# Patient Record
Sex: FEMALE | Race: WHITE | NOT HISPANIC OR LATINO | URBAN - METROPOLITAN AREA
[De-identification: names, ages, dates, MRNs, and addresses within clinical notes are randomized per-mention and may not be internally consistent; named-entity substitution may affect disease eponyms.]

---

## 2018-02-15 ENCOUNTER — OFFICE (OUTPATIENT)
Dept: URBAN - METROPOLITAN AREA CLINIC 11 | Facility: CLINIC | Age: 78
End: 2018-02-15

## 2018-02-15 VITALS
HEART RATE: 65 BPM | HEIGHT: 65 IN | WEIGHT: 163 LBS | SYSTOLIC BLOOD PRESSURE: 167 MMHG | DIASTOLIC BLOOD PRESSURE: 72 MMHG

## 2018-02-15 DIAGNOSIS — R13.10 DYSPHAGIA, UNSPECIFIED: ICD-10-CM

## 2018-02-15 DIAGNOSIS — K21.9 GASTRO-ESOPHAGEAL REFLUX DISEASE WITHOUT ESOPHAGITIS: ICD-10-CM

## 2018-02-15 DIAGNOSIS — K58.0 IRRITABLE BOWEL SYNDROME WITH DIARRHEA: ICD-10-CM

## 2018-02-15 PROCEDURE — 99213 OFFICE O/P EST LOW 20 MIN: CPT | Performed by: INTERNAL MEDICINE

## 2018-02-15 RX ORDER — SUCRALFATE 1 G/10ML
200 SUSPENSION ORAL
Qty: 30 | Refills: 2 | Status: COMPLETED
Start: 2018-02-15 | End: 2018-02-21

## 2018-02-15 RX ORDER — OMEPRAZOLE 40 MG/1
80 CAPSULE, DELAYED RELEASE ORAL
Qty: 60 | Refills: 3 | Status: COMPLETED
Start: 2016-02-18 | End: 2020-01-10

## 2018-02-21 ENCOUNTER — AMBULATORY SURGICAL CENTER (OUTPATIENT)
Dept: URBAN - METROPOLITAN AREA SURGERY 3 | Facility: SURGERY | Age: 78
End: 2018-02-21
Payer: MEDICARE

## 2018-02-21 ENCOUNTER — AMBULATORY SURGICAL CENTER (OUTPATIENT)
Dept: URBAN - METROPOLITAN AREA SURGERY 3 | Facility: SURGERY | Age: 78
End: 2018-02-21

## 2018-02-21 ENCOUNTER — OFFICE (OUTPATIENT)
Dept: URBAN - METROPOLITAN AREA PATHOLOGY 22 | Facility: PATHOLOGY | Age: 78
End: 2018-02-21

## 2018-02-21 VITALS
OXYGEN SATURATION: 96 % | RESPIRATION RATE: 18 BRPM | SYSTOLIC BLOOD PRESSURE: 108 MMHG | RESPIRATION RATE: 19 BRPM | DIASTOLIC BLOOD PRESSURE: 65 MMHG | OXYGEN SATURATION: 95 % | HEART RATE: 68 BPM | SYSTOLIC BLOOD PRESSURE: 156 MMHG | SYSTOLIC BLOOD PRESSURE: 152 MMHG | RESPIRATION RATE: 16 BRPM | WEIGHT: 160 LBS | RESPIRATION RATE: 18 BRPM | HEART RATE: 70 BPM | SYSTOLIC BLOOD PRESSURE: 108 MMHG | DIASTOLIC BLOOD PRESSURE: 74 MMHG | DIASTOLIC BLOOD PRESSURE: 56 MMHG | HEART RATE: 78 BPM | DIASTOLIC BLOOD PRESSURE: 74 MMHG | DIASTOLIC BLOOD PRESSURE: 56 MMHG | HEART RATE: 65 BPM | SYSTOLIC BLOOD PRESSURE: 152 MMHG | DIASTOLIC BLOOD PRESSURE: 75 MMHG | TEMPERATURE: 97.1 F | OXYGEN SATURATION: 96 % | DIASTOLIC BLOOD PRESSURE: 75 MMHG | OXYGEN SATURATION: 95 % | OXYGEN SATURATION: 98 % | DIASTOLIC BLOOD PRESSURE: 69 MMHG | HEART RATE: 78 BPM | TEMPERATURE: 97.3 F | OXYGEN SATURATION: 93 % | TEMPERATURE: 97.3 F | SYSTOLIC BLOOD PRESSURE: 124 MMHG | HEIGHT: 65 IN | WEIGHT: 160 LBS | SYSTOLIC BLOOD PRESSURE: 124 MMHG | RESPIRATION RATE: 19 BRPM | HEART RATE: 77 BPM | HEART RATE: 70 BPM | HEART RATE: 77 BPM | HEART RATE: 68 BPM | HEIGHT: 65 IN | OXYGEN SATURATION: 98 % | OXYGEN SATURATION: 93 % | HEART RATE: 65 BPM | DIASTOLIC BLOOD PRESSURE: 69 MMHG | TEMPERATURE: 97.1 F | DIASTOLIC BLOOD PRESSURE: 65 MMHG | SYSTOLIC BLOOD PRESSURE: 156 MMHG | SYSTOLIC BLOOD PRESSURE: 138 MMHG | RESPIRATION RATE: 16 BRPM | SYSTOLIC BLOOD PRESSURE: 138 MMHG

## 2018-02-21 DIAGNOSIS — K31.89 OTHER DISEASES OF STOMACH AND DUODENUM: ICD-10-CM

## 2018-02-21 DIAGNOSIS — R13.10 DYSPHAGIA, UNSPECIFIED: ICD-10-CM

## 2018-02-21 DIAGNOSIS — K44.9 DIAPHRAGMATIC HERNIA WITHOUT OBSTRUCTION OR GANGRENE: ICD-10-CM

## 2018-02-21 PROCEDURE — G8918 PT W/O PREOP ORDER IV AB PRO: HCPCS | Performed by: INTERNAL MEDICINE

## 2018-02-21 PROCEDURE — 88342 IMHCHEM/IMCYTCHM 1ST ANTB: CPT | Performed by: INTERNAL MEDICINE

## 2018-02-21 PROCEDURE — 88313 SPECIAL STAINS GROUP 2: CPT | Performed by: INTERNAL MEDICINE

## 2018-02-21 PROCEDURE — 43239 EGD BIOPSY SINGLE/MULTIPLE: CPT | Performed by: INTERNAL MEDICINE

## 2018-02-21 PROCEDURE — 88305 TISSUE EXAM BY PATHOLOGIST: CPT | Performed by: INTERNAL MEDICINE

## 2018-02-21 PROCEDURE — G8907 PT DOC NO EVENTS ON DISCHARG: HCPCS | Performed by: INTERNAL MEDICINE

## 2019-12-13 ENCOUNTER — OFFICE (OUTPATIENT)
Dept: URBAN - METROPOLITAN AREA CLINIC 11 | Facility: CLINIC | Age: 79
End: 2019-12-13

## 2019-12-13 VITALS
HEART RATE: 49 BPM | HEIGHT: 65 IN | DIASTOLIC BLOOD PRESSURE: 34 MMHG | SYSTOLIC BLOOD PRESSURE: 110 MMHG | WEIGHT: 150 LBS

## 2019-12-13 DIAGNOSIS — Z79.01 LONG TERM (CURRENT) USE OF ANTICOAGULANTS: ICD-10-CM

## 2019-12-13 DIAGNOSIS — I50.9 HEART FAILURE, UNSPECIFIED: ICD-10-CM

## 2019-12-13 DIAGNOSIS — D50.9 IRON DEFICIENCY ANEMIA, UNSPECIFIED: ICD-10-CM

## 2019-12-13 DIAGNOSIS — J44.9 CHRONIC OBSTRUCTIVE PULMONARY DISEASE, UNSPECIFIED: ICD-10-CM

## 2019-12-13 PROCEDURE — 99214 OFFICE O/P EST MOD 30 MIN: CPT | Performed by: INTERNAL MEDICINE

## 2019-12-13 NOTE — SERVICEHPINOTES
She presents for evaluation of an iron def anemia.  She has had issues for a couple of months and was to have started iron a couple of months ago but did not start it.  Over he past month she has ntoed a decreasd energy level and just sits around.  She had a CBC yesterday with an HGB in the 9s. She has had some intermittent nose bleeds.  She has not had large quantity of bleeding.  She had emesis once after eating a large amount of red pepper jelly.  She has not had emmanuel bleeding.  She has not had melena or hematochezia.  She has not had vaginal or  bleeding.  She is not currently on iron. She had an EGD last year with a large hiatal hernia.  She had polyps in 2015.  She has had more difficulties with her CHF and is on diuretics.  She has not had chest pain or palpitations.  She has had COPD and recently has myles back on O2 wtih her weakness.

## 2019-12-13 NOTE — SERVICENOTES
We discussed her hx of iron def amemia, hx of polyps, lack of acute GI bleeding, and potential issues including colon cancer, amvs, gastritis, ulcers etc... as well as no GI issues.  With her overal health issues, she will need CV and pulmonary clearances and will likely need scopes at the hospital.  I will start her on IV iron given her refusal of PO iron.  She was to have had a colon last year but did not f/u .

## 2019-12-19 ENCOUNTER — OFFICE (OUTPATIENT)
Dept: URBAN - METROPOLITAN AREA CLINIC 11 | Facility: CLINIC | Age: 79
End: 2019-12-19

## 2019-12-19 DIAGNOSIS — T45.4X5A ADVERSE EFFECT OF IRON AND ITS COMPOUNDS, INITIAL ENCOUNTER: ICD-10-CM

## 2019-12-19 DIAGNOSIS — D50.9 IRON DEFICIENCY ANEMIA, UNSPECIFIED: ICD-10-CM

## 2019-12-19 PROCEDURE — 96365 THER/PROPH/DIAG IV INF INIT: CPT | Performed by: INTERNAL MEDICINE

## 2020-01-10 ENCOUNTER — OFFICE (OUTPATIENT)
Dept: URBAN - METROPOLITAN AREA CLINIC 11 | Facility: CLINIC | Age: 80
End: 2020-01-10

## 2020-01-10 VITALS
HEIGHT: 65 IN | WEIGHT: 158 LBS | HEART RATE: 59 BPM | SYSTOLIC BLOOD PRESSURE: 141 MMHG | DIASTOLIC BLOOD PRESSURE: 51 MMHG

## 2020-01-10 DIAGNOSIS — D50.9 IRON DEFICIENCY ANEMIA, UNSPECIFIED: ICD-10-CM

## 2020-01-10 DIAGNOSIS — K44.9 DIAPHRAGMATIC HERNIA WITHOUT OBSTRUCTION OR GANGRENE: ICD-10-CM

## 2020-01-10 DIAGNOSIS — J44.9 CHRONIC OBSTRUCTIVE PULMONARY DISEASE, UNSPECIFIED: ICD-10-CM

## 2020-01-10 PROCEDURE — 99214 OFFICE O/P EST MOD 30 MIN: CPT | Performed by: INTERNAL MEDICINE

## 2020-01-10 RX ORDER — OMEPRAZOLE 40 MG/1
80 CAPSULE, DELAYED RELEASE ORAL
Qty: 60 | Refills: 3 | Status: COMPLETED
Start: 2016-02-18 | End: 2020-01-10

## 2020-01-10 RX ORDER — DEXLANSOPRAZOLE 60 MG/1
60 CAPSULE, DELAYED RELEASE ORAL
Qty: 30 | Refills: 3 | Status: COMPLETED
Start: 2020-01-10 | End: 2020-01-13

## 2020-01-10 NOTE — SERVICENOTES
Pt was seen with Micaela PARKER.  I agree with the above assessment.  She has improved with the iron infusions and her HGB was up to 10.  She has not had obvious bleeding.  She has had stools cards this week which are pending.  She also has the hx of multiple adenomas and is due for her f/u colon this year.  As to her GERD, she is on Prilosec BID and Pepcid 40mg at night but still has issues especially after eating smoked foods or spicy foods.  She had an EGD in 2018.  I would do the ugi to evaluate the size of her hiatal hernia which may cause some issues with her COPD.  I would like her to f/u with Dr. Nguyen sooner than her Feb appt.  She has had some difficulty being consistent with her diuretics which has been corrected by her daughter.   We discussed changing from Prilosec to Dexilant and continuing the Pepcid at night.

## 2020-01-10 NOTE — SERVICEHPINOTES
Ms. Markle is an 79 year old  female that presents today to follow-up with anemia. She states that her acid reflux has gotten worse. Everything she eats has a funny taste to it and has no appetite to eat it. In a week's time she has 3-4 episodes of reflux/nausea/vomiting. Day after Thanksgiving she vomited a pink tinged substance. Bowel movements every other day to 3 days. BM color brown. Complains of fatigue that got better a couple days after infusion. Now she is more fatigued again and doesn't have the energy to do anything. In the last two days she has been more out of breath feeling like she needs more oxygen, but when she checks her oxygen it is within normal range. Dr. Zuniga did blood work on Monday 1/6/2020.  RDW 25.3, RBC 4.04, Hgb 10.4, Hct 35.0, MCH 25.8, MCV 86.4, Platelet 135. Had one iron infusion but is unsure why the 2 infusions are four weeks apart instead of 1 week apart. Next infusion on 1/15/20.  did 3 hemoccult cards, awaiting results. We are awaiting cardiac and pulmonary clearance for scope.

## 2020-01-16 ENCOUNTER — OFFICE (OUTPATIENT)
Dept: URBAN - METROPOLITAN AREA CLINIC 11 | Facility: CLINIC | Age: 80
End: 2020-01-16

## 2020-01-16 DIAGNOSIS — D50.9 IRON DEFICIENCY ANEMIA, UNSPECIFIED: ICD-10-CM

## 2020-01-16 PROCEDURE — 96365 THER/PROPH/DIAG IV INF INIT: CPT | Performed by: INTERNAL MEDICINE

## 2021-02-05 ENCOUNTER — OFFICE (OUTPATIENT)
Dept: URBAN - METROPOLITAN AREA CLINIC 11 | Facility: CLINIC | Age: 81
End: 2021-02-05

## 2021-02-05 VITALS
DIASTOLIC BLOOD PRESSURE: 65 MMHG | OXYGEN SATURATION: 95 % | SYSTOLIC BLOOD PRESSURE: 190 MMHG | HEART RATE: 70 BPM | HEIGHT: 65 IN | WEIGHT: 129 LBS

## 2021-02-05 DIAGNOSIS — K21.9 GASTRO-ESOPHAGEAL REFLUX DISEASE WITHOUT ESOPHAGITIS: ICD-10-CM

## 2021-02-05 DIAGNOSIS — K59.00 CONSTIPATION, UNSPECIFIED: ICD-10-CM

## 2021-02-05 DIAGNOSIS — R13.10 DYSPHAGIA, UNSPECIFIED: ICD-10-CM

## 2021-02-05 PROCEDURE — 99214 OFFICE O/P EST MOD 30 MIN: CPT | Performed by: NURSE PRACTITIONER

## 2021-02-05 RX ORDER — OMEPRAZOLE 40 MG/1
80 CAPSULE, DELAYED RELEASE ORAL
Qty: 30 | Refills: 2 | Status: ACTIVE
Start: 2021-02-05

## 2021-02-05 RX ORDER — OMEPRAZOLE 40 MG/1
40 CAPSULE, DELAYED RELEASE ORAL
Qty: 30 | Refills: 2 | Status: ACTIVE
Start: 2021-02-05

## 2021-02-05 NOTE — SERVICENOTES
With her comorbidities we will start with an Esophagram and MBS for the dysphagia, hoarseness, and acid reflux. We will start her back on Omeprazole BID since she was well controlled on BID therapy before she was hospitalized in October. 

We will start her on OTC Miralax and powder fiber supplementation for her constipation. Will follow up on this in 4 weeks. If this does not work we can try a prescription medication.

## 2021-02-05 NOTE — SERVICEHPINOTES
Ms. Markle presents today with acid reflux. She notes that she has hoarseness that started approximately 1 month ago. She notes that it gets worse throughout the day. She notes that she has acid reflux approximately every other day. She notes that it feels as if she has a lump in her throat. She notes that she has difficulty swallowing anything. She denies nausea and vomiting. She denies melena. She denies use of NSAIDs.She was recently in the hospital in October for hypertension. She notes that all of her medications were changed when she was discharged.She notes that she has chronic constipation. She has 1 bowel movement per week. She notes that stools are hard and large. She denies aggravating factors. She takes Sennakot daily with mild improvement and intermittent use of suppositories when she feels impacted. She has never tried taking Miralax. She denies hematochezia and melena.  BR

## 2022-04-26 ENCOUNTER — OFFICE (OUTPATIENT)
Dept: URBAN - METROPOLITAN AREA CLINIC 11 | Facility: CLINIC | Age: 82
End: 2022-04-26

## 2022-04-26 VITALS
SYSTOLIC BLOOD PRESSURE: 185 MMHG | DIASTOLIC BLOOD PRESSURE: 70 MMHG | HEIGHT: 65 IN | WEIGHT: 148 LBS | HEART RATE: 65 BPM | OXYGEN SATURATION: 96 %

## 2022-04-26 DIAGNOSIS — K58.0 IRRITABLE BOWEL SYNDROME WITH DIARRHEA: ICD-10-CM

## 2022-04-26 DIAGNOSIS — S09.90XA UNSPECIFIED INJURY OF HEAD, INITIAL ENCOUNTER: ICD-10-CM

## 2022-04-26 PROCEDURE — 99214 OFFICE O/P EST MOD 30 MIN: CPT | Performed by: INTERNAL MEDICINE

## 2022-04-26 NOTE — SERVICENOTES
We discussed her IBS and that after her CNS evaluation/trauma eval, that I would like a lactulose breath testing to evaluate for bacterial overgrowth related issues.  We discussed the need for evaluation of her head/neck and dizziness today (appt at 120 with Viky Chase) and in the ER if her sx worsen.  They agreed to proceed.

## 2022-04-26 NOTE — SERVICEHPINOTES
Pt presents for evaluation for alternating bowel patterns.  She has had some diarrhea mixed with constipation.  She has had the issues for many years which comes and goes.  She has noted some improvement after probiotics (a gumi but she did not know the name) for a while but not consistently.  She has not had blood in her stools.  She has had some urgency at times.  She may have 2-3 stools in a day and the may not have stools for a few days (2-3 days). Prior to some of the stools she will have cramping. The stools have been normal in color and brown.  She has not had n/v with this.  She has not had acute abdominal pain with it. 
nati damian She has not bee an good candidate for procedures due to her cardiac issues as per cardiology.  nati damian She had a fall last Friday at [employer].  She had slipped on a wet floor that afternoon and fell backward.  She had a posterior scalp laceration.  She was seen in the ER that day and had the laceration sealed. She may have had a few second of  confusions/LOC after she hit her head.  Since then she has had some feeling of dizziness and feeling of an unstable gait.  She has not had acute CNS changes.  She has not had further falls or LOC issues.  Today she has developed a stripe like contusion on the right lateral neck that expands to the base of her head on the back and then up along the back of her head with a dark blue border and a crimson interior. (see below). 
nati damian She had a CT in the ER after the fall and apparently did not have a fracture that was noted at the time or a CNS bleed. nati damian She is on Eliquis.which she has continued.  
nati damian She is scheduling to see Dr. Gross today.    nati damian

## 2022-07-20 ENCOUNTER — OFFICE (OUTPATIENT)
Dept: URBAN - METROPOLITAN AREA CLINIC 11 | Facility: CLINIC | Age: 82
End: 2022-07-20

## 2022-07-20 DIAGNOSIS — K58.0 IRRITABLE BOWEL SYNDROME WITH DIARRHEA: ICD-10-CM

## 2022-07-20 PROCEDURE — 91065 BREATH HYDROGEN/METHANE TEST: CPT | Performed by: INTERNAL MEDICINE

## 2023-01-01 ENCOUNTER — ON CAMPUS - OUTPATIENT (OUTPATIENT)
Dept: URBAN - METROPOLITAN AREA HOSPITAL 82 | Facility: HOSPITAL | Age: 83
End: 2023-01-01

## 2023-01-01 ENCOUNTER — OFFICE (OUTPATIENT)
Dept: URBAN - METROPOLITAN AREA CLINIC 11 | Facility: CLINIC | Age: 83
End: 2023-01-01

## 2023-01-01 VITALS
HEART RATE: 85 BPM | WEIGHT: 150 LBS | DIASTOLIC BLOOD PRESSURE: 75 MMHG | OXYGEN SATURATION: 93 % | SYSTOLIC BLOOD PRESSURE: 166 MMHG | HEIGHT: 65 IN

## 2023-01-01 VITALS
SYSTOLIC BLOOD PRESSURE: 139 MMHG | HEART RATE: 89 BPM | WEIGHT: 148 LBS | DIASTOLIC BLOOD PRESSURE: 71 MMHG | HEIGHT: 65 IN

## 2023-01-01 DIAGNOSIS — K59.09 OTHER CONSTIPATION: ICD-10-CM

## 2023-01-01 DIAGNOSIS — K21.9 GASTRO-ESOPHAGEAL REFLUX DISEASE WITHOUT ESOPHAGITIS: ICD-10-CM

## 2023-01-01 DIAGNOSIS — K44.9 DIAPHRAGMATIC HERNIA WITHOUT OBSTRUCTION OR GANGRENE: ICD-10-CM

## 2023-01-01 DIAGNOSIS — J44.9 CHRONIC OBSTRUCTIVE PULMONARY DISEASE, UNSPECIFIED: ICD-10-CM

## 2023-01-01 PROCEDURE — 43235 EGD DIAGNOSTIC BRUSH WASH: CPT | Performed by: INTERNAL MEDICINE

## 2023-01-01 PROCEDURE — 99214 OFFICE O/P EST MOD 30 MIN: CPT | Performed by: INTERNAL MEDICINE

## 2023-01-01 PROCEDURE — 99214 OFFICE O/P EST MOD 30 MIN: CPT | Performed by: NURSE PRACTITIONER

## 2023-01-01 RX ORDER — FAMOTIDINE 40 MG/1
40 TABLET, FILM COATED ORAL
Qty: 90 | Refills: 0 | Status: COMPLETED
Start: 2023-01-01 | End: 2023-01-01

## 2023-01-01 RX ORDER — FAMOTIDINE 40 MG/1
40 TABLET, FILM COATED ORAL
Qty: 90 | Refills: 3 | Status: ACTIVE
Start: 2023-01-01

## 2023-02-14 NOTE — SERVICENOTES
We reviewed her worsening of symptoms on appropriate medication and need for evaluation with EGD. Will continue with the Famotidine since she has had some mild improvement of her symptoms with this addition. She sounds like to she has some constipation with pelvic floor dysfunction as well. I would like to add Miralax to her regimen and have her use a squatty potty with bowel movements..

## 2023-02-14 NOTE — SERVICEHPINOTES
Ms. Markle presents today for worsening of reflux. She takes Omeprazole twice a day. She will have reflux when she lays down at bedtime. She will typically wait an hour to lay after eating. She frequently lays down throughout the day so waiting a long period of time before laying down has been difficult. She does have the HOB elevated when she lays down. She recently added Famotidine at bedtime with some improvement. No early satiety. 
nati damian 
She continues to have alternating bowel habits. She will go several days without a bowel movement. She will have several bowel movements in 1 day. Stools are formed to soft. She will use suppositories or take stool softeners to get it soft enough to get it out. She will often have to manually remove stool from her rectum. She was taking Metamucil previously with no significant improvement. nati damian She has a history of COPD with need for oxygen at bedtime. She does get SOB pretty easily.